# Patient Record
Sex: MALE | Race: WHITE | Employment: OTHER | URBAN - METROPOLITAN AREA
[De-identification: names, ages, dates, MRNs, and addresses within clinical notes are randomized per-mention and may not be internally consistent; named-entity substitution may affect disease eponyms.]

---

## 2024-11-07 ENCOUNTER — OFFICE VISIT (OUTPATIENT)
Age: 84
End: 2024-11-07

## 2024-11-07 VITALS
OXYGEN SATURATION: 96 % | HEART RATE: 58 BPM | DIASTOLIC BLOOD PRESSURE: 80 MMHG | RESPIRATION RATE: 15 BRPM | SYSTOLIC BLOOD PRESSURE: 119 MMHG | TEMPERATURE: 98 F

## 2024-11-07 DIAGNOSIS — T14.8XXA WOUND INFECTION: Primary | ICD-10-CM

## 2024-11-07 DIAGNOSIS — L08.9 WOUND INFECTION: Primary | ICD-10-CM

## 2024-11-07 DIAGNOSIS — Z48.02 ENCOUNTER FOR REMOVAL OF SUTURES: ICD-10-CM

## 2024-11-07 PROBLEM — G20.A1 PARKINSON'S DISEASE (HCC): Status: ACTIVE | Noted: 2024-11-07

## 2024-11-07 RX ORDER — DOXYCYCLINE HYCLATE 100 MG
100 TABLET ORAL 2 TIMES DAILY
Qty: 14 TABLET | Refills: 0 | Status: SHIPPED | OUTPATIENT
Start: 2024-11-07 | End: 2024-11-14

## 2024-11-07 ASSESSMENT — ENCOUNTER SYMPTOMS: COLOR CHANGE: 1

## 2024-11-07 NOTE — PATIENT INSTRUCTIONS
Thank you for visiting [facility] today.     Wash twice daily soap and water  Apply bacitracin  Cover with non stick dressing    If you begin to have shortness of breath, chest pain or uncontrollable fever of 100.4 or greater, please go to the Emergency Room.

## 2024-11-07 NOTE — PROGRESS NOTES
Subjective     Chief Complaint   Patient presents with    Suture / Staple Removal     Pt had 5 sutures placed on right forearm 10/28 after a fall. In office today for removal.       Patient is 84 year old female presenting with suture removal.  She was seen on 10/28/24 after obtaining laceration on right forearm.  She did not  the doxycycline.  She reports a \"pus pocket\" and increased redness and swelling.           History reviewed. No pertinent past medical history.    History reviewed. No pertinent surgical history.    History reviewed. No pertinent family history.    Allergies   Allergen Reactions    Penicillins      Other Reaction(s): HIVES    Shellfish-Derived Products        Social History     Tobacco Use    Smoking status: Never    Smokeless tobacco: Never   Vaping Use    Vaping status: Never Used   Substance Use Topics    Alcohol use: Yes    Drug use: Never       Vitals:    11/07/24 1244   BP: 119/80   Pulse: 58   Resp: 15   Temp: 98 °F (36.7 °C)   SpO2: 96%       Review of Systems   Constitutional:  Negative for chills and fever.   Skin:  Positive for color change and wound.       Objective     Physical Exam  Vitals and nursing note reviewed.   Constitutional:       General: He is not in acute distress.     Appearance: Normal appearance. He is not ill-appearing.   HENT:      Head: Normocephalic and atraumatic.   Cardiovascular:      Rate and Rhythm: Normal rate.      Pulses: Normal pulses.   Pulmonary:      Effort: Pulmonary effort is normal.   Musculoskeletal:        Arms:       Comments: Redness, swelling   Skin:     General: Skin is warm and dry.      Findings: Erythema present.   Neurological:      Mental Status: He is alert and oriented to person, place, and time.        84 y.o. male presents with suture removal.   Consented for procedure.          SUTURE REMOVAL    Date/Time: 11/7/2024 1:15 PM    Performed by: Susanna Lambert APRN - CNP  Authorized by: Susanna Lambert APRN - CNP    Consent: